# Patient Record
Sex: FEMALE | Race: BLACK OR AFRICAN AMERICAN | NOT HISPANIC OR LATINO | Employment: UNEMPLOYED | URBAN - METROPOLITAN AREA
[De-identification: names, ages, dates, MRNs, and addresses within clinical notes are randomized per-mention and may not be internally consistent; named-entity substitution may affect disease eponyms.]

---

## 2018-07-18 LAB
C TRACH RRNA SPEC QL PROBE: NEGATIVE
N GONORRHOEA RRNA SPEC QL PROBE: NEGATIVE

## 2018-10-04 ENCOUNTER — HOSPITAL ENCOUNTER (OUTPATIENT)
Facility: HOSPITAL | Age: 18
Discharge: HOME | End: 2018-10-04
Attending: OBSTETRICS & GYNECOLOGY | Admitting: OBSTETRICS & GYNECOLOGY
Payer: COMMERCIAL

## 2018-10-04 VITALS — HEART RATE: 85 BPM | TEMPERATURE: 98 F | SYSTOLIC BLOOD PRESSURE: 117 MMHG | DIASTOLIC BLOOD PRESSURE: 68 MMHG

## 2018-10-04 LAB
BACTERIA UR QL AUTO: ABNORMAL /HPF
BILIRUB UR QL STRIP: NEGATIVE
CLARITY UR: ABNORMAL
COLOR UR: YELLOW
GLUCOSE UR STRIP-MCNC: NEGATIVE MG/DL
HGB UR QL STRIP.AUTO: NEGATIVE
KETONES UR STRIP-MCNC: NEGATIVE MG/DL
LEUKOCYTE ESTERASE UR QL STRIP: NEGATIVE
NITRITE UR QL STRIP: NEGATIVE
NON-SQ EPI CELLS URNS QL MICRO: ABNORMAL /HPF
PH UR STRIP.AUTO: 5.5 [PH] (ref 4.5–8)
PROT UR STRIP-MCNC: ABNORMAL MG/DL
RBC #/AREA URNS AUTO: ABNORMAL /HPF
SP GR UR STRIP.AUTO: >=1.03 (ref 1–1.03)
UROBILINOGEN UR QL STRIP.AUTO: 0.2 E.U./DL
WBC #/AREA URNS AUTO: ABNORMAL /HPF

## 2018-10-04 PROCEDURE — 99213 OFFICE O/P EST LOW 20 MIN: CPT

## 2018-10-04 PROCEDURE — 81001 URINALYSIS AUTO W/SCOPE: CPT | Performed by: STUDENT IN AN ORGANIZED HEALTH CARE EDUCATION/TRAINING PROGRAM

## 2018-10-04 NOTE — PROGRESS NOTES
L&D Triage Note - OB/GYN  Enid Galvan 19 y o  female MRN: [id number]  Unit/Bed#: L&D 329-02 Encounter: [id number]      Assessment:  19 y o   at estimated [address] presented with two days duration of RUQ abdominal pain that radiates toward LLQ and vaginal spotting r/o UTI vs   labor  Plan:  1  RUQ Abdominal pain   - UA pending   - patient's BP is WNL, preeclampsia unlikely   2   labor work-up:   - Transvaginal ultrasound: 3 30cm cervical length   - KOH: negative, no Clue cells   - Wet mount: negative, no trichomonads, no hyphae   - Nitrazine negative, negative ferning, negative pooling  3  Establish Prenatal care   - Scheduled follow up prenatal visit at [facility] [place] on 10/11/18 @4:30pm      ______________________________________________________________________      Chief Compliant: abdominal pain and vaginal spotting    TIME: 10:11  Subjective:  19 y o   estimated [address]    Patient is a 19year old female  with estimated [address] presented with 2 days duration of episodic abdominal pain and 3 day duration of vaginal spotting  She characterized the abdominal pain started at RUQ, sharp quality, pain scale of 8/10, and radiates from RUQ downward and to the LLQ  She states that each episode of abdominal pain last for about 5 minutes and subsided on its own  Abdominal pain usually occurs at night time when she sleeps and early in the morning  She also complains of blurry vision that started two days ago and decreased urination  Patient denied prior history of kidney stone  She moved from [place] two months ago and she has not had any prenatal visit since she moved here  Her last OB visit was approximately two months ago   Reported ANGE: 2021    Contraction: no  Fetal movement: yes  Vaginal bleeding: reported 3 day duration of vaginal spotting  Leakage of fluid: no    Ob Hx: 1 prior  with negative GBS  PMHx: none  PSHx: none  Social: denied nicotine use, EtOH, drug use  Denied history of STD  Medication: prenatal vitamin daily  Allergies: none  FMHx: Patient denied any family history significant for DM, HTN, blood clot, NTD, genetic disease      ROS: (+)blurry vision, RUQ pain, decreased urination, back pain  Denied HA, SOB, chest pain, N/V, dizziness, leg pain, fever       Objective:  Vitals:    10/04/18 0953   BP:    Pulse:    Temp: 98 °F (36 7 °C)     PE:  General: AAOx3  No in acute distress  Cardio: RRR  No murmur  Lungs: CTA B/L  No wheezing, rhonchi, crackles  Abdomen: Soft  RUQ palpation elicited mid-lower abdominal pain  Renal:  Mild left-sided CVA tenderness  MSK: no edema, no tenderness  Speculum Exam: scan white, translucent discharge in cervical os  Cervix is closed  Nitrazine: negative  Ferning: negative  Pooling: negative  KOH: negative, no clue cells  Wet mount: negative, no trichomonads, no hyphae    TVUS: cervical length recorded as: 3 23, 3 30, 3 62cm                Wilber Townsend MD 10/4/2018 10:11 AM

## 2018-10-11 ENCOUNTER — OFFICE VISIT (OUTPATIENT)
Dept: OBGYN CLINIC | Facility: CLINIC | Age: 18
End: 2018-10-11
Payer: COMMERCIAL

## 2018-10-11 VITALS — WEIGHT: 126.8 LBS | DIASTOLIC BLOOD PRESSURE: 72 MMHG | HEART RATE: 85 BPM | SYSTOLIC BLOOD PRESSURE: 112 MMHG

## 2018-10-11 DIAGNOSIS — Z34.02 ENCOUNTER FOR PRENATAL CARE IN SECOND TRIMESTER OF FIRST PREGNANCY: Primary | ICD-10-CM

## 2018-10-11 DIAGNOSIS — Z3A.22 22 WEEKS GESTATION OF PREGNANCY: ICD-10-CM

## 2018-10-11 PROCEDURE — 99214 OFFICE O/P EST MOD 30 MIN: CPT | Performed by: OBSTETRICS & GYNECOLOGY

## 2018-10-11 NOTE — PROGRESS NOTES
Assessment/Plan:      Diagnoses and all orders for this visit:    Encounter for prenatal care in second trimester of first pregnancy  -     Prenatal Panel; Future  -     Ambulatory Referral to Maternal Fetal Medicine; Future    22 weeks gestation of pregnancy  -     Ambulatory Referral to Maternal Fetal Medicine; Future    Other orders  -     IRON PO; Take 1 tablet by mouth          Subjective:     Patient ID: Lea Toro is a 19 y o  female  Pt is a 22yo  at 22w6d presenting as ER follow-up from 10/4/18  Pt was seen in L&D Triage for c/o RUQ pain that was eventually diagnosed as musculoskeletal in nature  Pt states she has not had any more pain  Denies vaginal bleeding, loss of fluid, ctx  Reports positive fetal movement  Pt recently moved to [place] from [facility] and was recommended to establish care with [facility]  Pt states she is in the process of getting her prenatal records from her OBGYN  Pt states this was an unintended, but welcomed pregnancy  She had a  1 year ago  Pt is living with FOB who is from [place]  She is not currently working       FH: 22wk  FHT: 150s    1) Late transfer of care, second trimester  F/u prenatal panel, lab slip given  Encouraged patient to obtain records ASAP   F/u MFM referral    2) IUP at 22w6d  Continue prenatal vitamin  Nursing intake appointment scheduled Monday 10/15/18    Patient seen by Dr Lesley Hedrick MD  OBGYN, PGY-2  10/11/2018 4:56 PM          Review of Systems      Objective:     Physical Exam

## 2018-10-15 ENCOUNTER — INITIAL PRENATAL (OUTPATIENT)
Dept: OBGYN CLINIC | Facility: CLINIC | Age: 18
End: 2018-10-15
Payer: COMMERCIAL

## 2018-10-15 VITALS
BODY MASS INDEX: 22.13 KG/M2 | HEART RATE: 94 BPM | WEIGHT: 129.6 LBS | HEIGHT: 64 IN | DIASTOLIC BLOOD PRESSURE: 78 MMHG | SYSTOLIC BLOOD PRESSURE: 124 MMHG

## 2018-10-15 DIAGNOSIS — Z13.31 DEPRESSION SCREEN: Primary | ICD-10-CM

## 2018-10-15 PROCEDURE — 99211 OFF/OP EST MAY X REQ PHY/QHP: CPT

## 2018-10-15 NOTE — PROGRESS NOTES
OB Intake  o Patient presents for OB intake interview  o Accompanied by: Self  o   o Hx of  delivery prior to 36 weeks 6 days: no  o LMP: Patient's last menstrual period was 2018 (approximate)  o Estimated Date of Delivery: 19    - confirmed by U/S   o Signs and Symptoms of pregnancy:  - positive home pregnancy test  - Constipation: no  - Headaches: no  - Cramping/spotting: no  - PICA cravings: no  - Diabetes: If you answer yes, please order 1 hr gtt testing, 50grams   History of gestational diabetes no   BMI >35  no   Advance maternal age >35 no   First degree relative with type 2 diabetes no   History of PCOS no   Current metformin use no   Prior history of macrosomia or LGA no  o Immunization Record  -   - There is no immunization history on file for this patient   o Tdap:  - Counseled to be given after 28 weeks  - Influenza vaccine discussed  o MRSA questionnaire: negative  o Dental visit within last 6 months  - no, recommendations discussed  Interview education:  Atilio Stewart Pregnancy Essentials booklet given to patient  Reviewed and explained   Handouts given at todays visit  o Tiana Whitmore & me phone application guide  o [facility] Baby & Me support center  o CDCs Response to [address] [place] [address] Maternal Fetal Medicine  -   o ANGE letter given  [facility] [address]  - Work         Transfer of care from [place]  Records faxed to [facility] today  Awaiting for records to be scanned into Media    Referral given to [facility] for patient to schedule a level 2 U/S    Patient believes she got the influenza vaccine in [place]    Patient in the process of getting [facility]    EPDS score a 17, patient states "she is more emotional then usual " She believes this has to do with hormones and moving to an unfamiliar area  I tried to call both social workers in [place] [id number] who are both off sick   Referral placed to our  to call her on Wednesday or Thursday       Interview done by: Eileen Lucia RN 10/15/18

## 2018-10-15 NOTE — PATIENT INSTRUCTIONS
Pregnancy   WHAT YOU NEED TO KNOW:   What do I need to know about pregnancy? A normal pregnancy lasts about 40 weeks  The first trimester lasts from your last period through the 12th week of pregnancy  The second trimester lasts from the 13th week of your pregnancy through the 23rd week  The third trimester lasts from your 24th week of pregnancy until your baby is born  If you know the date of your last period, your healthcare provider can estimate your due date  You may give birth to your baby any time from 37 weeks to 2 weeks after your due date  What is prenatal care? Prenatal care is a series of visits with your healthcare provider throughout your pregnancy  Prenatal care can help prevent problems during pregnancy and childbirth  At each prenatal visit, your healthcare provider will weigh you and check your blood pressure  Your healthcare provider will also check your baby's heartbeat and growth  You may also need the following at some visits:  · A pelvic exam  allows your healthcare provider to see your cervix (the bottom part of your uterus)  Your healthcare provider uses a speculum to gently open your vagina  He will check the size and shape of your uterus  · Blood tests  may be done to check for gestational diabetes and anemia (low iron level)  You may need other blood tests, such as blood type, Rh factor, or tests to check for birth defects  · A fetal ultrasound  shows pictures of your baby inside your uterus  It shows your baby's development  The movement and position of your baby can also be seen  Your healthcare provider may be able to tell you what your baby's gender is during the ultrasound  What can I do to have a healthy pregnancy? · Eat a variety of healthy foods  Healthy foods include fruits, vegetables, whole-grain breads, low-fat dairy foods, beans, lean meats, and fish  Drink liquids as directed  Ask how much liquid to drink each day and which liquids are best for you   Limit caffeine to less than 200 milligrams each day  Limit your intake of fish to 2 servings each week  Choose fish low in mercury such as canned light tuna, shrimp, crab, salmon, cod, or tilapia  Do not  eat fish high in mercury such as swordfish, tilefish, felice mackerel, and shark  · Take prenatal vitamins as directed  Your need for certain vitamins and minerals, such as folic acid, increases during pregnancy  Prenatal vitamins provide some of the extra vitamins and minerals you need  Prenatal vitamins may also help to decrease the risk of certain birth defects  · Ask how much weight you should gain during your pregnancy  Too much or too little weight gain can be unhealthy for you and your baby  · Talk to your healthcare provider about exercise  Moderate exercise can help you stay fit  Your healthcare provider will help you plan an exercise program that is safe for you during pregnancy  · Do not smoke  If you smoke, it is never too late to quit  Smoking increases your risk of a miscarriage and other health problems during your pregnancy  Smoking can cause your baby to be born too early or weigh less at birth  Ask your healthcare provider for information if you need help quitting  · Do not drink alcohol  Alcohol passes from your body to your baby through the placenta  It can affect your baby's brain development and cause fetal alcohol syndrome (FAS)  FAS is a group of conditions that causes mental, behavior, and growth problems  · Talk to your healthcare provider before you take any medicines  Many medicines may harm your baby if you take them when you are pregnant  Do not take any medicines, vitamins, herbs, or supplements without first talking to your healthcare provider  Never use illegal or street drugs (such as marijuana or cocaine) while you are pregnant  What body changes may happen during my pregnancy?    · Breast changes  you will experience include tenderness and tingling during the early part of your pregnancy  Your breasts will become larger  You may need to use a support bra  You may see a thin, yellow fluid, called colostrum, leak from your nipples during the second trimester  Colostrum is a liquid that changes to milk about 3 days after you give birth  · Skin changes and stretch marks  may occur during your pregnancy  You may have red marks, called stretch marks, on your skin  Stretch marks will usually fade after pregnancy  Use lotion if your skin is dry and itchy  The skin on your face, around your nipples, and below your belly button may darken  Most of the time, your skin will return to its normal color after your baby is born  · Morning sickness  is nausea and vomiting that can happen at any time of day  Avoid fatty and spicy foods  Eat small meals throughout the day instead of large meals  Mora may help to decrease nausea  Ask your healthcare provider about other ways of decreasing nausea and vomiting  · Heartburn  may be caused by changes in your hormones during pregnancy  Your growing uterus may also push your stomach upward and force stomach acid to back up into your esophagus  Eat 4 or 5 small meals each day instead of large meals  Avoid spicy foods  Avoid eating right before bedtime  · Constipation  may develop during your pregnancy  To treat constipation, eat foods high in fiber such as fiber cereals, beans, fruits, vegetables, whole-grain breads, and prune juice  Get regular exercise and drink plenty of water  Your healthcare provider may also suggest a fiber supplement to soften your bowel movements  Talk to your healthcare provider before you use any medicines to decrease constipation  · Hemorrhoids  are enlarged veins in the rectal area  They may cause pain, itching, and bright red bleeding from your rectum  To decrease your risk of hemorrhoids, prevent constipation and do not strain to have a bowel movement   If you have hemorrhoids, soak in a tub of warm water to ease discomfort  Ask your healthcare provider how you can treat hemorrhoids  · Leg cramps and swelling  may be caused by low calcium levels or the added weight of pregnancy  Raise your legs above the level of your heart to decrease swelling  During a leg cramp, stretch or massage the muscle that has the cramp  Heat may help decrease pain and muscle spasms  Apply heat on your muscle for 20 to 30 minutes every 2 hours for as many days as directed  · Back pain  may occur as your baby grows  Do not stand for long periods of time or lift heavy items  Use good posture while you stand, squat, or bend  Wear low-heeled shoes with good support  Rest may also help to relieve back pain  Ask your healthcare provider about exercises you can do to strengthen your back muscles  What are some safety tips during pregnancy? · Avoid hot tubs and saunas  Do not use a hot tub or sauna while you are pregnant, especially during your first trimester  Hot tubs and saunas may raise your baby's temperature and increase the risk of birth defects  · Avoid toxoplasmosis  This is an infection caused by eating raw meat or being around infected cat feces  It can cause birth defects, miscarriages, and other problems  Wash your hands after you touch raw meat  Make sure any meat is well-cooked before you eat it  Avoid raw eggs and unpasteurized milk  Use gloves or ask someone else to clean your cat's litter box while you are pregnant  · Ask your healthcare provider about travel  The most comfortable time to travel is during the second trimester  Ask your healthcare provider if you can travel after 36 weeks  You may not be able to travel in an airplane after 36 weeks  He may also recommend that you avoid long road trips  When should I seek immediate care? · You develop a severe headache that does not go away  · You have new or increased vision changes, such as blurred or spotted vision      · You have new or increased swelling in your face or hands  · You have pain or cramping in your abdomen or low back  · You have vaginal bleeding  When should I contact my healthcare provider? · You have abdominal cramps, pressure, or tightening  · You have a change in vaginal discharge  · You cannot keep food or drinks down, and you are losing weight  · You have chills or a fever  · You have vaginal itching, burning, or pain  · You have yellow, green, white, or foul-smelling vaginal discharge  · You have pain or burning when you urinate, less urine than usual, or pink or bloody urine  · You have questions or concerns about your condition or care  CARE AGREEMENT:   You have the right to help plan your care  Learn about your health condition and how it may be treated  Discuss treatment options with your caregivers to decide what care you want to receive  You always have the right to refuse treatment  The above information is an  only  It is not intended as medical advice for individual conditions or treatments  Talk to your doctor, nurse or pharmacist before following any medical regimen to see if it is safe and effective for you  © 2017 [address]  Information is for End User's use only and may not be sold, redistributed or otherwise used for commercial purposes  All illustrations and images included in CareNotes® are the copyrighted property of [employer]  or [employer]

## 2018-10-17 ENCOUNTER — PATIENT OUTREACH (OUTPATIENT)
Dept: OBGYN CLINIC | Facility: CLINIC | Age: 18
End: 2018-10-17

## 2018-10-17 NOTE — PROGRESS NOTES
Received request to contact patient regarding her elevated depression scale score at time of prenatal intake appointment  Call to patient regarding same  Patient reports she is feeling ok today  She admits that her sadness "comes and goes "  She has just moved in the past month to this area from [place] to be with her boyfriend who is FOB  Patient reports that her family including her Mother, Roberta Shoemaker and cousins live in [place]  She reports that she misses them and speaks with them daily  Patient resides here with NHUNG and their one year old son and she is currently expecting her second child  She is not working at present and states that [facility] provides for her financially and adds that he is emotionally supportive as well  Patient denies SI/HI at this time however she does state that she may be interested in outpatient counseling  Reviewed local in network agencies with patient and contact information provided  She will call to schedule appointment and will contact  for further assistance as needed  Supportive counseling provided to patient and she denies further needs at this time  Will continue to be available to provide support

## 2019-01-25 ENCOUNTER — TELEPHONE (OUTPATIENT)
Dept: OBGYN CLINIC | Facility: CLINIC | Age: 19
End: 2019-01-25